# Patient Record
Sex: FEMALE | URBAN - METROPOLITAN AREA
[De-identification: names, ages, dates, MRNs, and addresses within clinical notes are randomized per-mention and may not be internally consistent; named-entity substitution may affect disease eponyms.]

---

## 2019-05-08 ENCOUNTER — APPOINTMENT (OUTPATIENT)
Age: 59
Setting detail: DERMATOLOGY
End: 2019-05-14

## 2019-05-08 DIAGNOSIS — Z41.9 ENCOUNTER FOR PROCEDURE FOR PURPOSES OTHER THAN REMEDYING HEALTH STATE, UNSPECIFIED: ICD-10-CM

## 2019-05-08 DIAGNOSIS — L57.0 ACTINIC KERATOSIS: ICD-10-CM

## 2019-05-08 PROCEDURE — 17003 DESTRUCT PREMALG LES 2-14: CPT

## 2019-05-08 PROCEDURE — OTHER LIQUID NITROGEN: OTHER

## 2019-05-08 PROCEDURE — OTHER TREATMENT REGIMEN: OTHER

## 2019-05-08 PROCEDURE — OTHER MEDICAL CONSULTATION: FILLERS: OTHER

## 2019-05-08 PROCEDURE — 17000 DESTRUCT PREMALG LESION: CPT

## 2019-05-08 ASSESSMENT — LOCATION DETAILED DESCRIPTION DERM
LOCATION DETAILED: RIGHT INFERIOR MEDIAL MALAR CHEEK
LOCATION DETAILED: GLABELLA
LOCATION DETAILED: LEFT INFERIOR MEDIAL MALAR CHEEK
LOCATION DETAILED: RIGHT MEDIAL EYEBROW

## 2019-05-08 ASSESSMENT — LOCATION SIMPLE DESCRIPTION DERM
LOCATION SIMPLE: RIGHT EYEBROW
LOCATION SIMPLE: GLABELLA
LOCATION SIMPLE: RIGHT CHEEK
LOCATION SIMPLE: LEFT CHEEK

## 2019-05-08 ASSESSMENT — LOCATION ZONE DERM: LOCATION ZONE: FACE

## 2019-05-08 NOTE — PROCEDURE: TREATMENT REGIMEN
Initiate Treatment: Will apply Cordran ointment twice a day to the area that was treated with Cryo x 2 weeks, patient was advised to used Cordran ointment first before apply any other cream on face
Samples Given: Altreno
Samples Given: Cordran ointment
Detail Level: Zone
Initiate Treatment: Will apply a pea size amount of Altreno every night after washing face, then Revision SHAR night will apply vitamin c in the morning. If patient likes Altreno May call office to have RX sent in

## 2019-05-08 NOTE — PROCEDURE: LIQUID NITROGEN
Post-Care Instructions: I reviewed with the patient in detail post-care instructions. Patient is to wear sunprotection, and avoid picking at any of the treated lesions. Pt may apply Vaseline to crusted or scabbing areas.
Detail Level: Detailed
Number Of Freeze-Thaw Cycles: 2 freeze-thaw cycles
Render Note In Bullet Format When Appropriate: No
Duration Of Freeze Thaw-Cycle (Seconds): 1
Consent: The patient's consent was obtained including but not limited to risks of crusting, scabbing, blistering, scarring, darker or lighter pigmentary change, recurrence, incomplete removal and infection.

## 2019-05-22 ENCOUNTER — APPOINTMENT (OUTPATIENT)
Age: 59
Setting detail: DERMATOLOGY
End: 2019-05-22

## 2019-05-22 DIAGNOSIS — L57.0 ACTINIC KERATOSIS: ICD-10-CM

## 2019-05-22 PROBLEM — L55.1 SUNBURN OF SECOND DEGREE: Status: ACTIVE | Noted: 2019-05-22

## 2019-05-22 PROBLEM — L85.3 XEROSIS CUTIS: Status: ACTIVE | Noted: 2019-05-22

## 2019-05-22 PROBLEM — J30.1 ALLERGIC RHINITIS DUE TO POLLEN: Status: ACTIVE | Noted: 2019-05-22

## 2019-05-22 PROBLEM — J45.909 UNSPECIFIED ASTHMA, UNCOMPLICATED: Status: ACTIVE | Noted: 2019-05-22

## 2019-05-22 PROCEDURE — OTHER COUNSELING: OTHER

## 2019-05-22 PROCEDURE — 99024 POSTOP FOLLOW-UP VISIT: CPT

## 2019-05-22 ASSESSMENT — LOCATION SIMPLE DESCRIPTION DERM
LOCATION SIMPLE: GLABELLA
LOCATION SIMPLE: RIGHT EYEBROW

## 2019-05-22 ASSESSMENT — LOCATION ZONE DERM: LOCATION ZONE: FACE

## 2019-05-22 ASSESSMENT — LOCATION DETAILED DESCRIPTION DERM
LOCATION DETAILED: RIGHT MEDIAL EYEBROW
LOCATION DETAILED: GLABELLA